# Patient Record
Sex: FEMALE | Race: BLACK OR AFRICAN AMERICAN | NOT HISPANIC OR LATINO
[De-identification: names, ages, dates, MRNs, and addresses within clinical notes are randomized per-mention and may not be internally consistent; named-entity substitution may affect disease eponyms.]

---

## 2018-12-27 ENCOUNTER — RX RENEWAL (OUTPATIENT)
Age: 63
End: 2018-12-27

## 2019-03-25 ENCOUNTER — RX RENEWAL (OUTPATIENT)
Age: 64
End: 2019-03-25

## 2019-03-27 ENCOUNTER — RECORD ABSTRACTING (OUTPATIENT)
Age: 64
End: 2019-03-27

## 2019-03-27 DIAGNOSIS — Z80.0 FAMILY HISTORY OF MALIGNANT NEOPLASM OF DIGESTIVE ORGANS: ICD-10-CM

## 2019-03-27 DIAGNOSIS — Z82.49 FAMILY HISTORY OF ISCHEMIC HEART DISEASE AND OTHER DISEASES OF THE CIRCULATORY SYSTEM: ICD-10-CM

## 2019-03-27 DIAGNOSIS — Z83.438 FAMILY HISTORY OF OTHER DISORDER OF LIPOPROTEIN METABOLISM AND OTHER LIPIDEMIA: ICD-10-CM

## 2019-03-27 DIAGNOSIS — Z80.3 FAMILY HISTORY OF MALIGNANT NEOPLASM OF BREAST: ICD-10-CM

## 2019-03-27 DIAGNOSIS — Z78.9 OTHER SPECIFIED HEALTH STATUS: ICD-10-CM

## 2019-03-29 ENCOUNTER — RX RENEWAL (OUTPATIENT)
Age: 64
End: 2019-03-29

## 2019-04-01 ENCOUNTER — APPOINTMENT (OUTPATIENT)
Dept: FAMILY MEDICINE | Facility: CLINIC | Age: 64
End: 2019-04-01
Payer: COMMERCIAL

## 2019-04-01 VITALS — SYSTOLIC BLOOD PRESSURE: 148 MMHG | DIASTOLIC BLOOD PRESSURE: 88 MMHG

## 2019-04-01 VITALS
WEIGHT: 176 LBS | OXYGEN SATURATION: 97 % | HEIGHT: 60 IN | BODY MASS INDEX: 34.55 KG/M2 | SYSTOLIC BLOOD PRESSURE: 160 MMHG | DIASTOLIC BLOOD PRESSURE: 100 MMHG | HEART RATE: 77 BPM

## 2019-04-01 DIAGNOSIS — F17.200 NICOTINE DEPENDENCE, UNSPECIFIED, UNCOMPLICATED: ICD-10-CM

## 2019-04-01 PROCEDURE — 99406 BEHAV CHNG SMOKING 3-10 MIN: CPT | Mod: 25

## 2019-04-01 PROCEDURE — G0444 DEPRESSION SCREEN ANNUAL: CPT

## 2019-04-01 PROCEDURE — 99214 OFFICE O/P EST MOD 30 MIN: CPT | Mod: 25

## 2019-04-01 NOTE — PHYSICAL EXAM
[Well Nourished] : well nourished [Well Developed] : well developed [No Lymphadenopathy] : no lymphadenopathy [Clear to Auscultation] : lungs were clear to auscultation bilaterally [Normal Rate] : normal rate  [Regular Rhythm] : with a regular rhythm [Normal S1, S2] : normal S1 and S2 [No Murmur] : no murmur heard [No Edema] : there was no peripheral edema [Speech Grossly Normal] : speech grossly normal [Normal Affect] : the affect was normal [Normal Mood] : the mood was normal

## 2019-04-01 NOTE — COUNSELING
[Tobacco Use Cessation Intermediate Greater Than 3 Minutes Up to 10 Minutes] : Tobacco Use Cessation Intermediate Greater Than 3 Minutes Up to 10 Minutes [ - Annual Depression Screening] : Annual Depression Screening

## 2019-04-02 LAB — CYTOLOGY CVX/VAG DOC THIN PREP: NEGATIVE

## 2019-04-02 NOTE — HEALTH RISK ASSESSMENT
[Patient reported mammogram was normal] : Patient reported mammogram was normal [Patient reported colonoscopy was normal] : Patient reported colonoscopy was normal [HIV test declined] : HIV test declined [Hepatitis C test declined] : Hepatitis C test declined [No falls in past year] : Patient reported no falls in the past year [0] : 2) Feeling down, depressed, or hopeless: Not at all (0) [Patient reported PAP Smear was normal] : Patient reported PAP Smear was normal [Change in mental status noted] : No change in mental status noted [Reports changes in hearing] : Reports no changes in hearing [Reports changes in vision] : Reports no changes in vision [MammogramDate] : 09/18 [PapSmearDate] : 07/11 [ColonoscopyDate] : 01/11 [ColonoscopyComments] : f/u 10 years [HepatitisCComments] : Will order with next bloodwork.  [de-identified] : little exercise but active job [de-identified] : good [[id number]] : 0

## 2019-04-02 NOTE — HISTORY OF PRESENT ILLNESS
[FreeTextEntry1] : Follow-up [de-identified] : Ms. JALEESA SILVEIRA is a 63 year old female here today for follow-up of BP and lipids. Last visit was 12/2017. Feels good.  Taking her medications as directed.  Diet is good. Little exercise. Wants to try patch to help her quit smoking. Motivated but not totally. \par

## 2019-04-02 NOTE — PLAN
[FreeTextEntry1] : Increase amlodipine from 5 mg to 10 mg daily.\par Continue other medications.\par Encouraged patient to continue to focus on weight through sensible and healthy eating and as much aerobic exercise as is tolerable.\par

## 2019-04-17 ENCOUNTER — APPOINTMENT (OUTPATIENT)
Dept: FAMILY MEDICINE | Facility: CLINIC | Age: 64
End: 2019-04-17
Payer: COMMERCIAL

## 2019-04-17 VITALS
WEIGHT: 176 LBS | HEART RATE: 62 BPM | OXYGEN SATURATION: 93 % | SYSTOLIC BLOOD PRESSURE: 120 MMHG | DIASTOLIC BLOOD PRESSURE: 80 MMHG | BODY MASS INDEX: 34.55 KG/M2 | HEIGHT: 60 IN

## 2019-04-17 PROCEDURE — 99214 OFFICE O/P EST MOD 30 MIN: CPT | Mod: 25

## 2019-04-17 PROCEDURE — 36415 COLL VENOUS BLD VENIPUNCTURE: CPT

## 2019-04-17 NOTE — HEALTH RISK ASSESSMENT
[] : Yes [No falls in past year] : Patient reported no falls in the past year [0] : 2) Feeling down, depressed, or hopeless: Not at all (0) [21-25] : 21-25 [de-identified] : 1 pack every 3 days [de-identified] : walking [de-identified] : good [[id number]] : 0

## 2019-04-17 NOTE — HISTORY OF PRESENT ILLNESS
[FreeTextEntry1] : Follow-up [de-identified] : Ms. JALEESA SILVEIRA is a 63 year old female here today for follow-up of her BP. Increased dose of amlodipine to 10 mg/day. c/o fatigue for last two weeks. Hx of iron deficiency anemia. \par

## 2019-04-17 NOTE — PLAN
[FreeTextEntry1] : Continue current medications\par  patches and start when patient is committed to stopping cigarette smoking.\par Encouraged patient to continue to focus on weight through sensible and healthy eating and as much aerobic exercise as is tolerable.\par

## 2019-04-17 NOTE — REVIEW OF SYSTEMS
[Fatigue] : fatigue [Negative] : Cardiovascular [Recent Change In Weight] : ~T no recent weight change [Shortness Of Breath] : no shortness of breath [Cough] : no cough

## 2019-04-17 NOTE — PHYSICAL EXAM
[No Edema] : there was no peripheral edema [Normal Mood] : the mood was normal [de-identified] : 1/6 WILBUR heard best 2nd ICS on the R

## 2019-04-17 NOTE — COUNSELING
[Weight management counseling provided] : Weight management [Healthy eating counseling provided] : healthy eating [Activity counseling provided] : activity [Good understanding] : Patient has a good understanding of disease, goals and obesity follow-up plan [Discussed Cessation Medication] : cessation medication was discussed [de-identified] : Patient to  nicotine patches.

## 2019-04-18 LAB
25(OH)D3 SERPL-MCNC: 44.6 NG/ML
ALBUMIN SERPL ELPH-MCNC: 4.8 G/DL
ALP BLD-CCNC: 74 U/L
ALT SERPL-CCNC: 26 U/L
ANION GAP SERPL CALC-SCNC: 13 MMOL/L
AST SERPL-CCNC: 32 U/L
BASOPHILS # BLD AUTO: 0.03 K/UL
BASOPHILS NFR BLD AUTO: 0.3 %
BILIRUB SERPL-MCNC: 0.4 MG/DL
BUN SERPL-MCNC: 18 MG/DL
CALCIUM SERPL-MCNC: 10.4 MG/DL
CHLORIDE SERPL-SCNC: 102 MMOL/L
CHOLEST SERPL-MCNC: 145 MG/DL
CHOLEST/HDLC SERPL: 3.4 RATIO
CO2 SERPL-SCNC: 27 MMOL/L
CREAT SERPL-MCNC: 0.85 MG/DL
EOSINOPHIL # BLD AUTO: 0.13 K/UL
EOSINOPHIL NFR BLD AUTO: 1.3 %
ESTIMATED AVERAGE GLUCOSE: 120 MG/DL
FERRITIN SERPL-MCNC: 175 NG/ML
GLUCOSE SERPL-MCNC: 89 MG/DL
HBA1C MFR BLD HPLC: 5.8 %
HCT VFR BLD CALC: 46.2 %
HDLC SERPL-MCNC: 43 MG/DL
HGB BLD-MCNC: 14.8 G/DL
IMM GRANULOCYTES NFR BLD AUTO: 0.3 %
IRON SATN MFR SERPL: 34 %
IRON SERPL-MCNC: 94 UG/DL
LDLC SERPL CALC-MCNC: 83 MG/DL
LYMPHOCYTES # BLD AUTO: 2.91 K/UL
LYMPHOCYTES NFR BLD AUTO: 28.8 %
MAN DIFF?: NORMAL
MCHC RBC-ENTMCNC: 31 PG
MCHC RBC-ENTMCNC: 32 GM/DL
MCV RBC AUTO: 96.9 FL
MONOCYTES # BLD AUTO: 0.48 K/UL
MONOCYTES NFR BLD AUTO: 4.7 %
NEUTROPHILS # BLD AUTO: 6.53 K/UL
NEUTROPHILS NFR BLD AUTO: 64.6 %
PLATELET # BLD AUTO: 271 K/UL
POTASSIUM SERPL-SCNC: 3.8 MMOL/L
PROT SERPL-MCNC: 7.6 G/DL
RBC # BLD: 4.77 M/UL
RBC # FLD: 15.6 %
SODIUM SERPL-SCNC: 142 MMOL/L
TIBC SERPL-MCNC: 276 UG/DL
TRIGL SERPL-MCNC: 97 MG/DL
TSH SERPL-ACNC: 1.4 UIU/ML
UIBC SERPL-MCNC: 182 UG/DL
VIT B12 SERPL-MCNC: 992 PG/ML
WBC # FLD AUTO: 10.11 K/UL

## 2019-04-22 ENCOUNTER — RX RENEWAL (OUTPATIENT)
Age: 64
End: 2019-04-22

## 2019-06-23 ENCOUNTER — RX RENEWAL (OUTPATIENT)
Age: 64
End: 2019-06-23

## 2019-09-16 ENCOUNTER — RX RENEWAL (OUTPATIENT)
Age: 64
End: 2019-09-16

## 2019-09-26 ENCOUNTER — RESULT REVIEW (OUTPATIENT)
Age: 64
End: 2019-09-26

## 2019-10-21 ENCOUNTER — RX RENEWAL (OUTPATIENT)
Age: 64
End: 2019-10-21

## 2019-12-24 ENCOUNTER — RX RENEWAL (OUTPATIENT)
Age: 64
End: 2019-12-24

## 2020-03-17 ENCOUNTER — RX RENEWAL (OUTPATIENT)
Age: 65
End: 2020-03-17

## 2020-04-19 ENCOUNTER — RX RENEWAL (OUTPATIENT)
Age: 65
End: 2020-04-19

## 2020-09-21 ENCOUNTER — RX RENEWAL (OUTPATIENT)
Age: 65
End: 2020-09-21

## 2020-09-22 RX ORDER — AMLODIPINE BESYLATE 5 MG/1
5 TABLET ORAL
Qty: 90 | Refills: 1 | Status: DISCONTINUED | COMMUNITY
Start: 2019-06-23 | End: 2020-09-22

## 2020-09-24 ENCOUNTER — APPOINTMENT (OUTPATIENT)
Dept: FAMILY MEDICINE | Facility: CLINIC | Age: 65
End: 2020-09-24
Payer: COMMERCIAL

## 2020-09-24 VITALS
BODY MASS INDEX: 34.55 KG/M2 | DIASTOLIC BLOOD PRESSURE: 90 MMHG | SYSTOLIC BLOOD PRESSURE: 160 MMHG | WEIGHT: 176 LBS | HEIGHT: 60 IN

## 2020-09-24 VITALS — DIASTOLIC BLOOD PRESSURE: 80 MMHG | SYSTOLIC BLOOD PRESSURE: 142 MMHG

## 2020-09-24 DIAGNOSIS — R73.03 PREDIABETES.: ICD-10-CM

## 2020-09-24 DIAGNOSIS — Z86.39 PERSONAL HISTORY OF OTHER ENDOCRINE, NUTRITIONAL AND METABOLIC DISEASE: ICD-10-CM

## 2020-09-24 DIAGNOSIS — Z00.00 ENCOUNTER FOR GENERAL ADULT MEDICAL EXAMINATION W/OUT ABNORMAL FINDINGS: ICD-10-CM

## 2020-09-24 PROCEDURE — 99406 BEHAV CHNG SMOKING 3-10 MIN: CPT

## 2020-09-24 PROCEDURE — G0438: CPT | Mod: 25

## 2020-09-24 NOTE — HEALTH RISK ASSESSMENT
[] : Yes [Monthly or less (1 pt)] : Monthly or less (1 point) [1 or 2 (0 pts)] : 1 or 2 (0 points) [Never (0 pts)] : Never (0 points) [No] : In the past 12 months have you used drugs other than those required for medical reasons? No [No falls in past year] : Patient reported no falls in the past year [0] : 2) Feeling down, depressed, or hopeless: Not at all (0) [Yes] : Yes [30 or more] : 30 or more [Audit-CScore] : 1 [de-identified] : walking [de-identified] : normal [[id number]] : 0

## 2020-09-24 NOTE — PLAN
[FreeTextEntry1] : Encouraged patient to continue to focus on weight through sensible and healthy eating and as much aerobic exercise as is tolerable.\par F/U in a few days for fasting labs.

## 2020-09-24 NOTE — COUNSELING
[Cessation strategies including cessation program discussed] : Cessation strategies including cessation program discussed [Use of nicotine replacement therapies and other medications discussed] : Use of nicotine replacement therapies and other medications discussed [Willing to Quit Smoking] : Willing to quit smoking [Tobacco Use Cessation Intermediate Greater Than 3 Minutes Up to 10 Minutes] : Tobacco Use Cessation Intermediate Greater Than 3 Minutes Up to 10 Minutes [FreeTextEntry1] : 5

## 2020-09-24 NOTE — HISTORY OF PRESENT ILLNESS
[FreeTextEntry1] : Medication refill\par F/U\par Due for her first Medicare Wellness [de-identified] : Ms. JALEESA SILVEIRA is a 64 year old female here today for follow-up\par Feeling good\par Smoking 1/2 PPD- no cough or SOB\par Having mammogram on Monday.\par Last colonoscopy was 2011. Nml\par Refuses vaccinations. \par

## 2020-09-24 NOTE — REVIEW OF SYSTEMS
[Negative] : Psychiatric [Recent Change In Weight] : ~T no recent weight change [Vision Problems] : no vision problems [FreeTextEntry9] : occasional right hip pain

## 2020-09-28 ENCOUNTER — RESULT REVIEW (OUTPATIENT)
Age: 65
End: 2020-09-28

## 2020-09-28 ENCOUNTER — RX RENEWAL (OUTPATIENT)
Age: 65
End: 2020-09-28

## 2020-09-28 LAB
25(OH)D3 SERPL-MCNC: 24.5 NG/ML
ALBUMIN SERPL ELPH-MCNC: 4.6 G/DL
ALP BLD-CCNC: 85 U/L
ALT SERPL-CCNC: 27 U/L
ANION GAP SERPL CALC-SCNC: 17 MMOL/L
AST SERPL-CCNC: 30 U/L
BASOPHILS # BLD AUTO: 0.03 K/UL
BASOPHILS NFR BLD AUTO: 0.3 %
BILIRUB SERPL-MCNC: 0.4 MG/DL
BUN SERPL-MCNC: 18 MG/DL
CALCIUM SERPL-MCNC: 9.7 MG/DL
CHLORIDE SERPL-SCNC: 102 MMOL/L
CHOLEST SERPL-MCNC: 149 MG/DL
CHOLEST/HDLC SERPL: 3.5 RATIO
CO2 SERPL-SCNC: 23 MMOL/L
CREAT SERPL-MCNC: 0.83 MG/DL
EOSINOPHIL # BLD AUTO: 0.15 K/UL
EOSINOPHIL NFR BLD AUTO: 1.5 %
GLUCOSE SERPL-MCNC: 90 MG/DL
HCT VFR BLD CALC: 48.5 %
HDLC SERPL-MCNC: 42 MG/DL
HGB BLD-MCNC: 14.8 G/DL
IMM GRANULOCYTES NFR BLD AUTO: 0.2 %
LDLC SERPL CALC-MCNC: 87 MG/DL
LYMPHOCYTES # BLD AUTO: 3.08 K/UL
LYMPHOCYTES NFR BLD AUTO: 29.8 %
MAN DIFF?: NORMAL
MCHC RBC-ENTMCNC: 30.5 GM/DL
MCHC RBC-ENTMCNC: 31.1 PG
MCV RBC AUTO: 101.9 FL
MONOCYTES # BLD AUTO: 0.49 K/UL
MONOCYTES NFR BLD AUTO: 4.7 %
NEUTROPHILS # BLD AUTO: 6.57 K/UL
NEUTROPHILS NFR BLD AUTO: 63.5 %
PLATELET # BLD AUTO: 252 K/UL
POTASSIUM SERPL-SCNC: 3.8 MMOL/L
PROT SERPL-MCNC: 7.4 G/DL
RBC # BLD: 4.76 M/UL
RBC # FLD: 17 %
SODIUM SERPL-SCNC: 142 MMOL/L
TRIGL SERPL-MCNC: 95 MG/DL
WBC # FLD AUTO: 10.34 K/UL

## 2020-10-22 PROBLEM — Z00.00 MEDICARE ANNUAL WELLNESS VISIT, SUBSEQUENT: Status: RESOLVED | Noted: 2020-09-24 | Resolved: 2020-10-22

## 2020-12-18 ENCOUNTER — RX RENEWAL (OUTPATIENT)
Age: 65
End: 2020-12-18

## 2021-03-12 ENCOUNTER — NON-APPOINTMENT (OUTPATIENT)
Age: 66
End: 2021-03-12

## 2021-03-12 ENCOUNTER — APPOINTMENT (OUTPATIENT)
Dept: FAMILY MEDICINE | Facility: CLINIC | Age: 66
End: 2021-03-12
Payer: COMMERCIAL

## 2021-03-12 VITALS
TEMPERATURE: 97.3 F | HEIGHT: 60 IN | BODY MASS INDEX: 34.36 KG/M2 | SYSTOLIC BLOOD PRESSURE: 140 MMHG | DIASTOLIC BLOOD PRESSURE: 80 MMHG | OXYGEN SATURATION: 97 % | WEIGHT: 175 LBS | HEART RATE: 60 BPM

## 2021-03-12 DIAGNOSIS — Z01.818 ENCOUNTER FOR OTHER PREPROCEDURAL EXAMINATION: ICD-10-CM

## 2021-03-12 PROCEDURE — 99072 ADDL SUPL MATRL&STAF TM PHE: CPT

## 2021-03-12 PROCEDURE — 99213 OFFICE O/P EST LOW 20 MIN: CPT | Mod: 25

## 2021-03-12 PROCEDURE — 36415 COLL VENOUS BLD VENIPUNCTURE: CPT

## 2021-03-12 PROCEDURE — 93000 ELECTROCARDIOGRAM COMPLETE: CPT

## 2021-03-15 LAB
25(OH)D3 SERPL-MCNC: 54.3 NG/ML
ALBUMIN SERPL ELPH-MCNC: 4.7 G/DL
ALP BLD-CCNC: 86 U/L
ALT SERPL-CCNC: 22 U/L
ANION GAP SERPL CALC-SCNC: 16 MMOL/L
AST SERPL-CCNC: 30 U/L
BASOPHILS # BLD AUTO: 0.03 K/UL
BASOPHILS NFR BLD AUTO: 0.3 %
BILIRUB SERPL-MCNC: 0.5 MG/DL
BUN SERPL-MCNC: 19 MG/DL
CALCIUM SERPL-MCNC: 9.7 MG/DL
CHLORIDE SERPL-SCNC: 100 MMOL/L
CHOLEST SERPL-MCNC: 148 MG/DL
CO2 SERPL-SCNC: 26 MMOL/L
CREAT SERPL-MCNC: 0.87 MG/DL
EOSINOPHIL # BLD AUTO: 0.11 K/UL
EOSINOPHIL NFR BLD AUTO: 1.2 %
GLUCOSE SERPL-MCNC: 78 MG/DL
HCT VFR BLD CALC: 48 %
HDLC SERPL-MCNC: 48 MG/DL
HGB BLD-MCNC: 15.4 G/DL
IMM GRANULOCYTES NFR BLD AUTO: 0.3 %
LDLC SERPL CALC-MCNC: 81 MG/DL
LYMPHOCYTES # BLD AUTO: 3.13 K/UL
LYMPHOCYTES NFR BLD AUTO: 33.2 %
MAN DIFF?: NORMAL
MCHC RBC-ENTMCNC: 31.5 PG
MCHC RBC-ENTMCNC: 32.1 GM/DL
MCV RBC AUTO: 98.2 FL
MONOCYTES # BLD AUTO: 0.64 K/UL
MONOCYTES NFR BLD AUTO: 6.8 %
NEUTROPHILS # BLD AUTO: 5.5 K/UL
NEUTROPHILS NFR BLD AUTO: 58.2 %
NONHDLC SERPL-MCNC: 100 MG/DL
PLATELET # BLD AUTO: 277 K/UL
POTASSIUM SERPL-SCNC: 3.7 MMOL/L
PROT SERPL-MCNC: 7.9 G/DL
RBC # BLD: 4.89 M/UL
RBC # FLD: 15.7 %
SODIUM SERPL-SCNC: 142 MMOL/L
TRIGL SERPL-MCNC: 97 MG/DL
WBC # FLD AUTO: 9.44 K/UL

## 2021-03-15 NOTE — HISTORY OF PRESENT ILLNESS
[No Pertinent Cardiac History] : no history of aortic stenosis, atrial fibrillation, coronary artery disease, recent myocardial infarction, or implantable device/pacemaker [Smoker] : smoker [No Adverse Anesthesia Reaction] : no adverse anesthesia reaction in self or family member [Family Member] : family member [(Patient denies any chest pain, claudication, dyspnea on exertion, orthopnea, palpitations or syncope)] : Patient denies any chest pain, claudication, dyspnea on exertion, orthopnea, palpitations or syncope [Asthma] : no asthma [COPD] : no COPD [Sleep Apnea] : no sleep apnea [Chronic Anticoagulation] : no chronic anticoagulation [Chronic Kidney Disease] : no chronic kidney disease [Diabetes] : no diabetes [FreeTextEntry1] : Cataract surgery [FreeTextEntry2] : 04/06/21 [FreeTextEntry3] :  [FreeTextEntry4] : Ms. JALEESA SILVEIRA is a 65 year old female here today for preoperative clearance.\par

## 2021-04-06 ENCOUNTER — HOSPITAL ENCOUNTER (OUTPATIENT)
Dept: HOSPITAL 74 - FASU | Age: 66
Discharge: HOME | End: 2021-04-06
Attending: OPHTHALMOLOGY
Payer: COMMERCIAL

## 2021-04-06 VITALS — DIASTOLIC BLOOD PRESSURE: 68 MMHG | HEART RATE: 78 BPM | SYSTOLIC BLOOD PRESSURE: 127 MMHG

## 2021-04-06 VITALS — TEMPERATURE: 98.1 F

## 2021-04-06 VITALS — BODY MASS INDEX: 35.5 KG/M2

## 2021-04-06 DIAGNOSIS — H26.9: Primary | ICD-10-CM

## 2021-04-06 PROCEDURE — 08RK3JZ REPLACEMENT OF LEFT LENS WITH SYNTHETIC SUBSTITUTE, PERCUTANEOUS APPROACH: ICD-10-PCS | Performed by: OPHTHALMOLOGY

## 2021-04-06 RX ADMIN — CYCLOPENTOLATE HYDROCHLORIDE ONE DROP: 10 SOLUTION/ DROPS OPHTHALMIC at 07:45

## 2021-04-06 RX ADMIN — TROPICAMIDE ONE DROP: 10 SOLUTION/ DROPS OPHTHALMIC at 07:45

## 2021-04-06 RX ADMIN — OFLOXACIN ONE DROP: 3 SOLUTION/ DROPS OPHTHALMIC at 08:05

## 2021-04-06 RX ADMIN — CYCLOPENTOLATE HYDROCHLORIDE ONE DROP: 10 SOLUTION/ DROPS OPHTHALMIC at 08:05

## 2021-04-06 RX ADMIN — PHENYLEPHRINE HYDROCHLORIDE ONE DROP: 0.25 SPRAY NASAL at 07:50

## 2021-04-06 RX ADMIN — PHENYLEPHRINE HYDROCHLORIDE ONE DROP: 0.25 SPRAY NASAL at 07:45

## 2021-04-06 RX ADMIN — OFLOXACIN ONE DROP: 3 SOLUTION/ DROPS OPHTHALMIC at 07:45

## 2021-04-06 RX ADMIN — TROPICAMIDE ONE DROP: 10 SOLUTION/ DROPS OPHTHALMIC at 08:05

## 2021-04-06 RX ADMIN — CYCLOPENTOLATE HYDROCHLORIDE ONE DROP: 10 SOLUTION/ DROPS OPHTHALMIC at 07:55

## 2021-04-06 RX ADMIN — KETOROLAC TROMETHAMINE ONE DROP: 5 SOLUTION OPHTHALMIC at 07:55

## 2021-04-06 RX ADMIN — OFLOXACIN ONE DROP: 3 SOLUTION/ DROPS OPHTHALMIC at 08:00

## 2021-04-06 RX ADMIN — KETOROLAC TROMETHAMINE ONE DROP: 5 SOLUTION OPHTHALMIC at 08:00

## 2021-04-06 RX ADMIN — PHENYLEPHRINE HYDROCHLORIDE ONE DROP: 0.25 SPRAY NASAL at 07:55

## 2021-04-06 RX ADMIN — CYCLOPENTOLATE HYDROCHLORIDE ONE DROP: 10 SOLUTION/ DROPS OPHTHALMIC at 08:00

## 2021-04-06 RX ADMIN — OFLOXACIN ONE DROP: 3 SOLUTION/ DROPS OPHTHALMIC at 07:50

## 2021-04-06 RX ADMIN — OFLOXACIN ONE DROP: 3 SOLUTION/ DROPS OPHTHALMIC at 07:55

## 2021-04-06 RX ADMIN — TROPICAMIDE ONE DROP: 10 SOLUTION/ DROPS OPHTHALMIC at 07:50

## 2021-04-06 RX ADMIN — KETOROLAC TROMETHAMINE ONE DROP: 5 SOLUTION OPHTHALMIC at 08:05

## 2021-04-06 RX ADMIN — TROPICAMIDE ONE DROP: 10 SOLUTION/ DROPS OPHTHALMIC at 08:00

## 2021-04-06 RX ADMIN — PHENYLEPHRINE HYDROCHLORIDE ONE DROP: 0.25 SPRAY NASAL at 08:00

## 2021-04-06 RX ADMIN — PHENYLEPHRINE HYDROCHLORIDE ONE DROP: 0.25 SPRAY NASAL at 08:05

## 2021-04-06 RX ADMIN — KETOROLAC TROMETHAMINE ONE DROP: 5 SOLUTION OPHTHALMIC at 07:50

## 2021-04-06 RX ADMIN — KETOROLAC TROMETHAMINE ONE DROP: 5 SOLUTION OPHTHALMIC at 07:45

## 2021-04-06 RX ADMIN — CYCLOPENTOLATE HYDROCHLORIDE ONE DROP: 10 SOLUTION/ DROPS OPHTHALMIC at 07:50

## 2021-04-06 RX ADMIN — TROPICAMIDE ONE DROP: 10 SOLUTION/ DROPS OPHTHALMIC at 07:55

## 2021-05-28 ENCOUNTER — APPOINTMENT (OUTPATIENT)
Dept: FAMILY MEDICINE | Facility: CLINIC | Age: 66
End: 2021-05-28
Payer: COMMERCIAL

## 2021-05-28 PROCEDURE — 99211 OFF/OP EST MAY X REQ PHY/QHP: CPT

## 2021-06-01 ENCOUNTER — HOSPITAL ENCOUNTER (OUTPATIENT)
Dept: HOSPITAL 74 - FASU | Age: 66
Discharge: HOME | End: 2021-06-01
Attending: OPHTHALMOLOGY
Payer: COMMERCIAL

## 2021-06-01 VITALS — SYSTOLIC BLOOD PRESSURE: 139 MMHG | DIASTOLIC BLOOD PRESSURE: 81 MMHG | HEART RATE: 76 BPM

## 2021-06-01 VITALS — BODY MASS INDEX: 35.5 KG/M2

## 2021-06-01 VITALS — TEMPERATURE: 97.9 F

## 2021-06-01 DIAGNOSIS — H26.9: Primary | ICD-10-CM

## 2021-06-01 PROCEDURE — 08RJ3JZ REPLACEMENT OF RIGHT LENS WITH SYNTHETIC SUBSTITUTE, PERCUTANEOUS APPROACH: ICD-10-PCS | Performed by: OPHTHALMOLOGY

## 2021-06-01 RX ADMIN — PHENYLEPHRINE HYDROCHLORIDE SCH DROP: 0.25 SPRAY NASAL at 09:30

## 2021-06-01 RX ADMIN — PHENYLEPHRINE HYDROCHLORIDE SCH DROP: 0.25 SPRAY NASAL at 09:40

## 2021-06-01 RX ADMIN — PHENYLEPHRINE HYDROCHLORIDE SCH DROP: 0.25 SPRAY NASAL at 09:35

## 2021-06-01 RX ADMIN — KETOROLAC TROMETHAMINE SCH DROP: 5 SOLUTION OPHTHALMIC at 09:40

## 2021-06-01 RX ADMIN — OFLOXACIN SCH DROP: 3 SOLUTION/ DROPS OPHTHALMIC at 09:45

## 2021-06-01 RX ADMIN — PHENYLEPHRINE HYDROCHLORIDE SCH DROP: 0.25 SPRAY NASAL at 09:25

## 2021-06-01 RX ADMIN — CYCLOPENTOLATE HYDROCHLORIDE SCH DROP: 10 SOLUTION/ DROPS OPHTHALMIC at 09:25

## 2021-06-01 RX ADMIN — OFLOXACIN SCH DROP: 3 SOLUTION/ DROPS OPHTHALMIC at 09:40

## 2021-06-01 RX ADMIN — KETOROLAC TROMETHAMINE SCH DROP: 5 SOLUTION OPHTHALMIC at 09:45

## 2021-06-01 RX ADMIN — TROPICAMIDE SCH DROP: 10 SOLUTION/ DROPS OPHTHALMIC at 09:30

## 2021-06-01 RX ADMIN — CYCLOPENTOLATE HYDROCHLORIDE SCH DROP: 10 SOLUTION/ DROPS OPHTHALMIC at 09:30

## 2021-06-01 RX ADMIN — KETOROLAC TROMETHAMINE SCH DROP: 5 SOLUTION OPHTHALMIC at 09:25

## 2021-06-01 RX ADMIN — OFLOXACIN SCH DROP: 3 SOLUTION/ DROPS OPHTHALMIC at 09:35

## 2021-06-01 RX ADMIN — OFLOXACIN SCH DROP: 3 SOLUTION/ DROPS OPHTHALMIC at 09:30

## 2021-06-01 RX ADMIN — TROPICAMIDE SCH DROP: 10 SOLUTION/ DROPS OPHTHALMIC at 09:40

## 2021-06-01 RX ADMIN — KETOROLAC TROMETHAMINE SCH DROP: 5 SOLUTION OPHTHALMIC at 09:30

## 2021-06-01 RX ADMIN — CYCLOPENTOLATE HYDROCHLORIDE SCH DROP: 10 SOLUTION/ DROPS OPHTHALMIC at 09:45

## 2021-06-01 RX ADMIN — TROPICAMIDE SCH DROP: 10 SOLUTION/ DROPS OPHTHALMIC at 09:25

## 2021-06-01 RX ADMIN — PHENYLEPHRINE HYDROCHLORIDE SCH DROP: 0.25 SPRAY NASAL at 09:45

## 2021-06-01 RX ADMIN — TROPICAMIDE SCH DROP: 10 SOLUTION/ DROPS OPHTHALMIC at 09:35

## 2021-06-01 RX ADMIN — OFLOXACIN SCH DROP: 3 SOLUTION/ DROPS OPHTHALMIC at 09:25

## 2021-06-01 RX ADMIN — CYCLOPENTOLATE HYDROCHLORIDE SCH DROP: 10 SOLUTION/ DROPS OPHTHALMIC at 09:40

## 2021-06-01 RX ADMIN — CYCLOPENTOLATE HYDROCHLORIDE SCH DROP: 10 SOLUTION/ DROPS OPHTHALMIC at 09:35

## 2021-06-01 RX ADMIN — KETOROLAC TROMETHAMINE SCH DROP: 5 SOLUTION OPHTHALMIC at 09:35

## 2021-06-01 RX ADMIN — TROPICAMIDE SCH DROP: 10 SOLUTION/ DROPS OPHTHALMIC at 09:45

## 2021-06-03 ENCOUNTER — RX RENEWAL (OUTPATIENT)
Age: 66
End: 2021-06-03

## 2021-06-03 LAB — SARS-COV-2 N GENE NPH QL NAA+PROBE: NOT DETECTED

## 2021-06-05 ENCOUNTER — APPOINTMENT (OUTPATIENT)
Dept: FAMILY MEDICINE | Facility: CLINIC | Age: 66
End: 2021-06-05
Payer: COMMERCIAL

## 2021-06-05 VITALS
SYSTOLIC BLOOD PRESSURE: 118 MMHG | BODY MASS INDEX: 34.16 KG/M2 | HEIGHT: 60 IN | TEMPERATURE: 98.6 F | WEIGHT: 174 LBS | HEART RATE: 78 BPM | DIASTOLIC BLOOD PRESSURE: 70 MMHG

## 2021-06-05 PROCEDURE — 99397 PER PM REEVAL EST PAT 65+ YR: CPT | Mod: 25

## 2021-06-05 PROCEDURE — 99407 BEHAV CHNG SMOKING > 10 MIN: CPT

## 2021-06-05 PROCEDURE — G0444 DEPRESSION SCREEN ANNUAL: CPT | Mod: 59

## 2021-06-05 NOTE — HISTORY OF PRESENT ILLNESS
[FreeTextEntry1] : Annual\par Hip pain [de-identified] : Ms. JALEESA SILVEIRA is a 65 year old female here today for her annual.\par c/o hip pain x 2 days. Woke up with patient. \par

## 2021-06-05 NOTE — COUNSELING
[Risk of tobacco use and health benefits of smoking cessation discussed] : Risk of tobacco use and health benefits of smoking cessation discussed [Use of nicotine replacement therapies and other medications discussed] : Use of nicotine replacement therapies and other medications discussed [Cessation strategies including cessation program discussed] : Cessation strategies including cessation program discussed [Willing to Quit Smoking] : Willing to quit smoking [Tobacco Use Cessation Intensive Greater Than 10 Minutes] : Tobacco Use Cessation Intensive Greater Than 10 Minutes [Benefits of weight loss discussed] : Benefits of weight loss discussed [Encouraged to increase physical activity] : Encouraged to increase physical activity [Target Wt Loss Goal ___] : Weight Loss Goals: Target weight loss goal [unfilled] lbs [Weigh Self Weekly] : weigh self weekly [____ min/wk Activity] : [unfilled] min/wk activity [Decrease Portions] : decrease portions [Keep Food Diary] : keep food diary [Needs reinforcement, provided] : Patient needs reinforcement on understanding of disease, goals and obesity follow-up plan; reinforcement was provided [FreeTextEntry2] : Discussed slow weaning and then if needed, try  oral agent or patch.  [FreeTextEntry3] : 12 [FreeTextEntry4] : 15

## 2021-06-05 NOTE — PHYSICAL EXAM
[No Lymphadenopathy] : no lymphadenopathy [Thyroid Normal, No Nodules] : the thyroid was normal and there were no nodules present [Clear to Auscultation] : lungs were clear to auscultation bilaterally [Normal Rate] : normal rate  [Regular Rhythm] : with a regular rhythm [Normal S1, S2] : normal S1 and S2 [No Edema] : there was no peripheral edema [Normal] : affect was normal and insight and judgment were intact [de-identified] : Overweight female [de-identified] : 1/6 WILBUR heard in aortic area [de-identified] : Increased cerumen in the right ear. [de-identified] : Tenderness with palpation of the right inguinal area- no reproduction of pain with movements of the leg. No hip pain. [de-identified] : Patient is walking with a limp.

## 2021-06-05 NOTE — HEALTH RISK ASSESSMENT
[Good] : ~his/her~  mood as  good [] : Yes [Yes] : Yes [Monthly or less (1 pt)] : Monthly or less (1 point) [1 or 2 (0 pts)] : 1 or 2 (0 points) [Never (0 pts)] : Never (0 points) [No] : In the past 12 months have you used drugs other than those required for medical reasons? No [No falls in past year] : Patient reported no falls in the past year [0] : 2) Feeling down, depressed, or hopeless: Not at all (0) [Patient reported mammogram was normal] : Patient reported mammogram was normal [Patient reported bone density results were normal] : Patient reported bone density results were normal [Patient reported colonoscopy was normal] : Patient reported colonoscopy was normal [HIV test declined] : HIV test declined [Hepatitis C test declined] : Hepatitis C test declined [None] : None [With Family] : lives with family [# of Members in Household ___] :  household currently consist of [unfilled] member(s) [Employed] : employed [High School] : high school [Single] : single [Feels Safe at Home] : Feels safe at home [Fully functional (bathing, dressing, toileting, transferring, walking, feeding)] : Fully functional (bathing, dressing, toileting, transferring, walking, feeding) [Fully functional (using the telephone, shopping, preparing meals, housekeeping, doing laundry, using] : Fully functional and needs no help or supervision to perform IADLs (using the telephone, shopping, preparing meals, housekeeping, doing laundry, using transportation, managing medications and managing finances) [Smoke Detector] : smoke detector [Carbon Monoxide Detector] : carbon monoxide detector [Seat Belt] :  uses seat belt [26-29] : 26-24 [No Retinopathy] : No retinopathy [Patient declined PAP Smear] : Patient declined PAP Smear [de-identified] : social drinker [Audit-CScore] : 1 [de-identified] : none [de-identified] : well balanced  [NHQ3Numsu] : 0 [EyeExamDate] : 01/21 [Change in mental status noted] : No change in mental status noted [Language] : denies difficulty with language [Learning/Retaining New Information] : denies difficulty learning/retaining new information [Behavior] : denies difficulty with behavior [Handling Complex Tasks] : denies difficulty handling complex tasks [Reasoning] : denies difficulty with reasoning [Spatial Ability and Orientation] : denies difficulty with spatial ability and orientation [Sexually Active] : not sexually active [High Risk Behavior] : no high risk behavior [Reports changes in hearing] : Reports no changes in hearing [Reports changes in dental health] : Reports no changes in dental health [Reports changes in vision] : Reports no changes in vision [Guns at Home] : no guns at home [Safety elements used in home] : no safety elements used in home [Sunscreen] : does not use sunscreen [Travel to Developing Areas] : does not  travel to developing areas [TB Exposure] : is not being exposed to tuberculosis [Caregiver Concerns] : does not have caregiver concerns [MammogramDate] : 09/20 [BoneDensityDate] : 01/19 [ColonoscopyDate] : 01/16 [FreeTextEntry2] : [facility] [de-identified] : Cataracts Removed in April left side and June her right [Patient/Caregiver not ready to engage] : Patient/Caregiver not ready to engage [AdvancecareDate] : 06/21

## 2021-06-07 ENCOUNTER — RESULT REVIEW (OUTPATIENT)
Age: 66
End: 2021-06-07

## 2021-06-08 ENCOUNTER — RX RENEWAL (OUTPATIENT)
Age: 66
End: 2021-06-08

## 2021-07-11 ENCOUNTER — RX RENEWAL (OUTPATIENT)
Age: 66
End: 2021-07-11

## 2021-09-13 ENCOUNTER — RX RENEWAL (OUTPATIENT)
Age: 66
End: 2021-09-13

## 2021-10-02 ENCOUNTER — RESULT REVIEW (OUTPATIENT)
Age: 66
End: 2021-10-02

## 2022-06-06 ENCOUNTER — APPOINTMENT (OUTPATIENT)
Dept: FAMILY MEDICINE | Facility: CLINIC | Age: 67
End: 2022-06-06
Payer: COMMERCIAL

## 2022-06-06 VITALS
HEIGHT: 60 IN | SYSTOLIC BLOOD PRESSURE: 120 MMHG | WEIGHT: 170 LBS | HEART RATE: 73 BPM | BODY MASS INDEX: 33.38 KG/M2 | DIASTOLIC BLOOD PRESSURE: 80 MMHG

## 2022-06-06 DIAGNOSIS — E55.9 VITAMIN D DEFICIENCY, UNSPECIFIED: ICD-10-CM

## 2022-06-06 DIAGNOSIS — E66.9 OBESITY, UNSPECIFIED: ICD-10-CM

## 2022-06-06 PROCEDURE — 36415 COLL VENOUS BLD VENIPUNCTURE: CPT

## 2022-06-06 PROCEDURE — G0444 DEPRESSION SCREEN ANNUAL: CPT | Mod: 59

## 2022-06-06 PROCEDURE — 99397 PER PM REEVAL EST PAT 65+ YR: CPT | Mod: 25

## 2022-06-06 PROCEDURE — 99406 BEHAV CHNG SMOKING 3-10 MIN: CPT

## 2022-06-06 RX ORDER — ERGOCALCIFEROL 1.25 MG/1
1.25 MG CAPSULE ORAL
Qty: 12 | Refills: 3 | Status: DISCONTINUED | COMMUNITY
Start: 2020-09-28 | End: 2022-06-06

## 2022-06-06 RX ORDER — ERGOCALCIFEROL 1.25 MG/1
1.25 MG CAPSULE, LIQUID FILLED ORAL
Qty: 12 | Refills: 0 | Status: DISCONTINUED | COMMUNITY
Start: 2020-09-28 | End: 2022-06-06

## 2022-06-06 RX ORDER — NICOTINE 21 MG/24HR
14 PATCH, TRANSDERMAL 24 HOURS TRANSDERMAL DAILY
Qty: 30 | Refills: 2 | Status: DISCONTINUED | COMMUNITY
Start: 2019-04-01 | End: 2022-06-06

## 2022-06-06 RX ORDER — NICOTINE 21 MG/24HR
21 PATCH, TRANSDERMAL 24 HOURS TRANSDERMAL DAILY
Qty: 30 | Refills: 2 | Status: DISCONTINUED | COMMUNITY
Start: 2019-04-01 | End: 2022-06-06

## 2022-06-06 NOTE — COUNSELING
[Yes] : Risk of tobacco use and health benefits of smoking cessation discussed: Yes [Cessation strategies including cessation program discussed] : Cessation strategies including cessation program discussed [Use of nicotine replacement therapies and other medications discussed] : Use of nicotine replacement therapies and other medications discussed [No] : Not willing to quit smoking [FreeTextEntry2] : Advised slow weaning.  [FreeTextEntry1] : 5

## 2022-06-06 NOTE — HEALTH RISK ASSESSMENT
[Good] : ~his/her~  mood as  good [Yes] : Yes [Monthly or less (1 pt)] : Monthly or less (1 point) [1 or 2 (0 pts)] : 1 or 2 (0 points) [Never (0 pts)] : Never (0 points) [No] : In the past 12 months have you used drugs other than those required for medical reasons? No [No falls in past year] : Patient reported no falls in the past year [0] : 2) Feeling down, depressed, or hopeless: Not at all (0) [No Retinopathy] : No retinopathy [Patient reported mammogram was normal] : Patient reported mammogram was normal [Patient declined PAP Smear] : Patient declined PAP Smear [Patient reported bone density results were normal] : Patient reported bone density results were normal [Patient reported colonoscopy was normal] : Patient reported colonoscopy was normal [HIV test declined] : HIV test declined [Hepatitis C test declined] : Hepatitis C test declined [None] : None [With Family] : lives with family [# of Members in Household ___] :  household currently consist of [unfilled] member(s) [Employed] : employed [High School] : high school [Single] : single [Feels Safe at Home] : Feels safe at home [Fully functional (bathing, dressing, toileting, transferring, walking, feeding)] : Fully functional (bathing, dressing, toileting, transferring, walking, feeding) [Fully functional (using the telephone, shopping, preparing meals, housekeeping, doing laundry, using] : Fully functional and needs no help or supervision to perform IADLs (using the telephone, shopping, preparing meals, housekeeping, doing laundry, using transportation, managing medications and managing finances) [Smoke Detector] : smoke detector [Carbon Monoxide Detector] : carbon monoxide detector [Seat Belt] :  uses seat belt [Current] : Current [15-19] : 15-19 [PHQ-2 Negative - No further assessment needed] : PHQ-2 Negative - No further assessment needed [de-identified] : 6 cigs a day x 50 yrs [de-identified] : social drinker [Audit-CScore] : 1 [de-identified] : none [de-identified] : well balanced  [[id number]] : 0 [EyeExamDate] : 05/21 [Change in mental status noted] : No change in mental status noted [Language] : denies difficulty with language [Behavior] : denies difficulty with behavior [Learning/Retaining New Information] : denies difficulty learning/retaining new information [Handling Complex Tasks] : denies difficulty handling complex tasks [Reasoning] : denies difficulty with reasoning [Spatial Ability and Orientation] : denies difficulty with spatial ability and orientation [Sexually Active] : not sexually active [High Risk Behavior] : no high risk behavior [Reports changes in hearing] : Reports no changes in hearing [Reports changes in vision] : Reports no changes in vision [Reports changes in dental health] : Reports no changes in dental health [Guns at Home] : no guns at home [Safety elements used in home] : no safety elements used in home [Sunscreen] : does not use sunscreen [Travel to Developing Areas] : does not  travel to developing areas [TB Exposure] : is not being exposed to tuberculosis [Caregiver Concerns] : does not have caregiver concerns [MammogramDate] : 09/21 [BoneDensityDate] : 01/19 [ColonoscopyDate] : 01/16 [FreeTextEntry2] : [facility] [de-identified] : Cataracts Removed in April left side and June her right

## 2022-06-06 NOTE — HISTORY OF PRESENT ILLNESS
[FreeTextEntry1] : Annual [de-identified] : Ms. JALEESA SILVEIRA is a 66 year old female here today for her annual\par Covid x 3 ( August 21 and Feb 22)\par Feeling well. \par

## 2022-06-06 NOTE — PHYSICAL EXAM
[Well Nourished] : well nourished [No Lymphadenopathy] : no lymphadenopathy [Thyroid Normal, No Nodules] : the thyroid was normal and there were no nodules present [Clear to Auscultation] : lungs were clear to auscultation bilaterally [Normal Rate] : normal rate  [Regular Rhythm] : with a regular rhythm [No Edema] : there was no peripheral edema [Normal] : affect was normal and insight and judgment were intact [de-identified] : cerumen impaction left EAC [de-identified] : 2/6 WILBUR heard best at aortic area

## 2022-06-07 LAB
25(OH)D3 SERPL-MCNC: 49.6 NG/ML
ALBUMIN SERPL ELPH-MCNC: 4.5 G/DL
ALP BLD-CCNC: 76 U/L
ALT SERPL-CCNC: 18 U/L
ANION GAP SERPL CALC-SCNC: 14 MMOL/L
AST SERPL-CCNC: 22 U/L
BILIRUB SERPL-MCNC: 0.2 MG/DL
BUN SERPL-MCNC: 22 MG/DL
CALCIUM SERPL-MCNC: 10.2 MG/DL
CHLORIDE SERPL-SCNC: 100 MMOL/L
CHOLEST SERPL-MCNC: 156 MG/DL
CO2 SERPL-SCNC: 26 MMOL/L
CREAT SERPL-MCNC: 0.93 MG/DL
EGFR: 67 ML/MIN/1.73M2
GLUCOSE SERPL-MCNC: 102 MG/DL
HDLC SERPL-MCNC: 47 MG/DL
LDLC SERPL CALC-MCNC: 87 MG/DL
NONHDLC SERPL-MCNC: 109 MG/DL
POTASSIUM SERPL-SCNC: 3.5 MMOL/L
PROT SERPL-MCNC: 7.2 G/DL
SODIUM SERPL-SCNC: 139 MMOL/L
TRIGL SERPL-MCNC: 110 MG/DL

## 2022-06-08 DIAGNOSIS — M85.80 OTHER SPECIFIED DISORDERS OF BONE DENSITY AND STRUCTURE, UNSPECIFIED SITE: ICD-10-CM

## 2022-06-13 ENCOUNTER — RESULT REVIEW (OUTPATIENT)
Age: 67
End: 2022-06-13

## 2022-09-23 ENCOUNTER — APPOINTMENT (OUTPATIENT)
Dept: FAMILY MEDICINE | Facility: CLINIC | Age: 67
End: 2022-09-23

## 2022-09-23 ENCOUNTER — RESULT REVIEW (OUTPATIENT)
Age: 67
End: 2022-09-23

## 2022-09-23 VITALS
BODY MASS INDEX: 33.26 KG/M2 | SYSTOLIC BLOOD PRESSURE: 144 MMHG | WEIGHT: 165 LBS | HEIGHT: 59 IN | OXYGEN SATURATION: 97 % | DIASTOLIC BLOOD PRESSURE: 80 MMHG | HEART RATE: 70 BPM | TEMPERATURE: 90.1 F

## 2022-09-23 DIAGNOSIS — M25.551 PAIN IN RIGHT HIP: ICD-10-CM

## 2022-09-23 PROCEDURE — 99213 OFFICE O/P EST LOW 20 MIN: CPT

## 2022-09-23 RX ORDER — MULTIVIT-MIN/FOLIC/VIT K/LYCOP 400-300MCG
25 MCG TABLET ORAL
Refills: 0 | Status: DISCONTINUED | COMMUNITY
End: 2022-09-23

## 2022-09-23 NOTE — HISTORY OF PRESENT ILLNESS
[FreeTextEntry8] : Ms. JALEESA SILVEIRA is a 66 year old female here today for right hip pain x 3 weeks. \par

## 2022-09-23 NOTE — HEALTH RISK ASSESSMENT
[Current] : Current [Yes] : Yes [Monthly or less (1 pt)] : Monthly or less (1 point) [1 or 2 (0 pts)] : 1 or 2 (0 points) [Never (0 pts)] : Never (0 points) [No falls in past year] : Patient reported no falls in the past year [0] : 2) Feeling down, depressed, or hopeless: Not at all (0) [PHQ-2 Negative - No further assessment needed] : PHQ-2 Negative - No further assessment needed [Audit-CScore] : 1 [de-identified] : none [de-identified] : regular [HFW5Mvryv] : 0

## 2022-09-28 ENCOUNTER — APPOINTMENT (OUTPATIENT)
Dept: FAMILY MEDICINE | Facility: CLINIC | Age: 67
End: 2022-09-28

## 2022-09-28 VITALS
WEIGHT: 165 LBS | HEIGHT: 59 IN | BODY MASS INDEX: 33.26 KG/M2 | SYSTOLIC BLOOD PRESSURE: 118 MMHG | DIASTOLIC BLOOD PRESSURE: 70 MMHG | OXYGEN SATURATION: 97 % | HEART RATE: 67 BPM | TEMPERATURE: 97.8 F

## 2022-09-28 PROCEDURE — 99213 OFFICE O/P EST LOW 20 MIN: CPT

## 2022-09-28 NOTE — HISTORY OF PRESENT ILLNESS
[FreeTextEntry8] : Pt is a 65 y/o F that presents to the clinic following for hip pain. She was seen on 9/23 and had xrays done which showed moderate arthritis. She was advised to take Aleve and ice the area. \par She continues to have moderate pain and finds it difficult to walk.

## 2022-09-28 NOTE — PHYSICAL EXAM
[Normal] : no acute distress, well nourished, well developed and well-appearing [de-identified] : b/l hip pain. tender to palpation. limited range of motion.

## 2022-10-10 ENCOUNTER — RESULT REVIEW (OUTPATIENT)
Age: 67
End: 2022-10-10

## 2023-06-19 ENCOUNTER — APPOINTMENT (OUTPATIENT)
Dept: FAMILY MEDICINE | Facility: CLINIC | Age: 68
End: 2023-06-19
Payer: COMMERCIAL

## 2023-06-19 VITALS
WEIGHT: 160 LBS | DIASTOLIC BLOOD PRESSURE: 70 MMHG | BODY MASS INDEX: 32.25 KG/M2 | HEIGHT: 59 IN | SYSTOLIC BLOOD PRESSURE: 120 MMHG

## 2023-06-19 DIAGNOSIS — M25.559 PAIN IN UNSPECIFIED HIP: ICD-10-CM

## 2023-06-19 DIAGNOSIS — R10.31 RIGHT LOWER QUADRANT PAIN: ICD-10-CM

## 2023-06-19 DIAGNOSIS — Z00.00 ENCOUNTER FOR GENERAL ADULT MEDICAL EXAMINATION W/OUT ABNORMAL FINDINGS: ICD-10-CM

## 2023-06-19 DIAGNOSIS — Z87.898 PERSONAL HISTORY OF OTHER SPECIFIED CONDITIONS: ICD-10-CM

## 2023-06-19 DIAGNOSIS — F17.210 NICOTINE DEPENDENCE, CIGARETTES, UNCOMPLICATED: ICD-10-CM

## 2023-06-19 DIAGNOSIS — Z20.822 CONTACT WITH AND (SUSPECTED) EXPOSURE TO COVID-19: ICD-10-CM

## 2023-06-19 DIAGNOSIS — H40.9 UNSPECIFIED GLAUCOMA: ICD-10-CM

## 2023-06-19 PROCEDURE — 99397 PER PM REEVAL EST PAT 65+ YR: CPT

## 2023-06-19 RX ORDER — DORZOLAMIDE HYDROCHLORIDE TIMOLOL MALEATE 20; 5 MG/ML; MG/ML
22.3-6.8 SOLUTION/ DROPS OPHTHALMIC
Qty: 10 | Refills: 0 | Status: DISCONTINUED | COMMUNITY
Start: 2021-10-28 | End: 2023-06-19

## 2023-06-19 NOTE — HISTORY OF PRESENT ILLNESS
[FreeTextEntry1] : Annual [de-identified] : Ms. JALEESA SILVEIRA is a 67 year old female here today for annual examination.\par Eating better and walking more\par Still has right hip pain. Was using meloxicam every other day. Takes with food. \par

## 2023-06-19 NOTE — REVIEW OF SYSTEMS
[Recent Change In Weight] : ~T recent weight change [Negative] : Psychiatric [Vision Problems] : no vision problems [FreeTextEntry9] : right hip pain

## 2023-06-26 DIAGNOSIS — E87.6 HYPOKALEMIA: ICD-10-CM

## 2023-06-26 DIAGNOSIS — T50.2X5A HYPOKALEMIA: ICD-10-CM

## 2023-06-26 LAB
ALBUMIN SERPL ELPH-MCNC: 4.3 G/DL
ALP BLD-CCNC: 76 U/L
ALT SERPL-CCNC: 18 U/L
ANION GAP SERPL CALC-SCNC: 12 MMOL/L
AST SERPL-CCNC: 24 U/L
BILIRUB SERPL-MCNC: 0.2 MG/DL
BUN SERPL-MCNC: 22 MG/DL
CALCIUM SERPL-MCNC: 10 MG/DL
CHLORIDE SERPL-SCNC: 105 MMOL/L
CHOLEST SERPL-MCNC: 155 MG/DL
CO2 SERPL-SCNC: 25 MMOL/L
CREAT SERPL-MCNC: 1.16 MG/DL
EGFR: 51 ML/MIN/1.73M2
GLUCOSE SERPL-MCNC: 100 MG/DL
HDLC SERPL-MCNC: 52 MG/DL
LDLC SERPL CALC-MCNC: 79 MG/DL
NONHDLC SERPL-MCNC: 104 MG/DL
POTASSIUM SERPL-SCNC: 3.4 MMOL/L
PROT SERPL-MCNC: 7.1 G/DL
SODIUM SERPL-SCNC: 143 MMOL/L
TRIGL SERPL-MCNC: 124 MG/DL

## 2023-06-27 ENCOUNTER — APPOINTMENT (OUTPATIENT)
Dept: THORACIC SURGERY | Facility: CLINIC | Age: 68
End: 2023-06-27
Payer: COMMERCIAL

## 2023-06-27 VITALS — BODY MASS INDEX: 33.26 KG/M2 | WEIGHT: 165 LBS | HEIGHT: 59 IN

## 2023-06-27 DIAGNOSIS — F17.210 NICOTINE DEPENDENCE, CIGARETTES, UNCOMPLICATED: ICD-10-CM

## 2023-06-27 PROCEDURE — G0296 VISIT TO DETERM LDCT ELIG: CPT

## 2023-06-27 RX ORDER — COLD-HOT PACK
EACH MISCELLANEOUS DAILY
Qty: 90 | Refills: 3 | Status: ACTIVE | COMMUNITY
Start: 2023-06-27 | End: 1900-01-01

## 2023-06-27 NOTE — HISTORY OF PRESENT ILLNESS
[Current] : Current [TextBox_13] : Referred by Dr. Rodriguez \par \par JALEESA SILVEIRA had telephonic visit for a review of eligibility and discussion of the Low dose CT lung cancer screening program. The following was reviewed and confirmed the patient meets screening eligibility criteria.\par -Age 67 year\par Smoking Status:\par -Current smoker\par Started smoking at 15   years old. \par -smokes up to 10 cigarettes per day for 52 years.\par -Number of pack years smokin\par \par Ms. SILVEIRA denies any signs or symptoms of lung cancer including new cough, changing cough, hemoptysis, and unintentional weight loss. \par \par Ms. SILVEIRA reports dx HTN and  high cholesterol. Covid infection 2022, mild symptoms. She denies COPD, heart disease, connective tissue disease, and any personal history of cancer. Reports no lung cancer in a 1st degree relative. Reports no lung cancer in a 2nd degree relative. Denies any  history of  occupational exposures. Had exposure to 2nd hand smoke while growing up.\par  [PacksperYear] : 26

## 2023-06-27 NOTE — ASSESSMENT
[Discussed Risks and Advised to Quit Smoking] : Discussed risks and advised to quit smoking [Discussed Cessation Medication] : cessation medication was discussed [Discussed Cessation Strategies] : cessation strategies were discussed [de-identified] : Acknowledged how  difficult it is to quit smoking. Advised that quitting smoking is the most important thing a person can do for their health. Discussed strategies to deal with cravings.  Discussed the importance of having a strategy planned in advance of quit date. Discussed use of daily nicotine patch and use of gum or lozenge prn for cravings. \par

## 2023-06-27 NOTE — PLAN
[Smoking Cessation Guidance Provided] : Smoking cessation guidance was provided to patient [[facility]] : referred to [facility] [phone] [Smoking Cessation] : smoking cessation [FreeTextEntry1] : Plan:\par \par -Low dose CT chest for lung cancer screening.    RAVIN BALL has been CC'd in order for LDCT.\par \par -Follow up with patient and her referring provider after her LDCT results have been reviewed by the multidisciplinary clinical team, if needed.\par \par -Encouraged smoking cessation.\par \par -Referred to CTC\par \par Should I screen? tool utilized. 6 Year risk of lung cancer is  2.7 %. \par \par Patient wishes to proceed with screening.\par \par Engaged in discussion regarding risks of screening during Covid-19 pandemic and precautions that are being used  to reduce exposure.\par \par Engaged in shared decision making with Ms. SILVEIRA . Answered all questions. She verbalized understanding and agreement. She knows to call back with and questions or concerns.\par

## 2023-07-03 ENCOUNTER — NON-APPOINTMENT (OUTPATIENT)
Age: 68
End: 2023-07-03

## 2023-07-03 ENCOUNTER — APPOINTMENT (OUTPATIENT)
Dept: CARDIOLOGY | Facility: CLINIC | Age: 68
End: 2023-07-03
Payer: COMMERCIAL

## 2023-07-03 VITALS
HEIGHT: 59 IN | SYSTOLIC BLOOD PRESSURE: 141 MMHG | BODY MASS INDEX: 32.66 KG/M2 | HEART RATE: 74 BPM | DIASTOLIC BLOOD PRESSURE: 71 MMHG | OXYGEN SATURATION: 97 % | WEIGHT: 162 LBS | RESPIRATION RATE: 14 BRPM

## 2023-07-03 DIAGNOSIS — E78.5 HYPERLIPIDEMIA, UNSPECIFIED: ICD-10-CM

## 2023-07-03 DIAGNOSIS — I10 ESSENTIAL (PRIMARY) HYPERTENSION: ICD-10-CM

## 2023-07-03 DIAGNOSIS — R01.1 CARDIAC MURMUR, UNSPECIFIED: ICD-10-CM

## 2023-07-03 PROCEDURE — 93000 ELECTROCARDIOGRAM COMPLETE: CPT

## 2023-07-03 PROCEDURE — 99204 OFFICE O/P NEW MOD 45 MIN: CPT | Mod: 25

## 2023-07-04 PROBLEM — E78.5 DYSLIPIDEMIA: Status: ACTIVE | Noted: 2020-09-26

## 2023-07-04 PROBLEM — R01.1 HEART MURMUR: Status: ACTIVE | Noted: 2023-06-19

## 2023-07-04 PROBLEM — I10 ESSENTIAL HYPERTENSION: Status: ACTIVE | Noted: 2018-12-27

## 2023-07-04 RX ORDER — NICOTINE POLACRILEX 2 MG/1
2 GUM, CHEWING BUCCAL
Qty: 170 | Refills: 0 | Status: ACTIVE | COMMUNITY
Start: 2023-06-27

## 2023-07-04 NOTE — ASSESSMENT
[FreeTextEntry1] : 66 yo female with hypertension, hyperlipidemia, and cardiac murmur. \par ECG today demonstrated sinus rhythm, rate 68 bpm.\par \par Given cardiac murmur and hypertension, patient is to undergo echocardiogram to assess function and structural heart disease. I spoke with Dr. Rodriguez's office who reported that they will schedule the echocardiogram. Will follow-up results when available.\par Pending review of echo results, will determine if further cardiac work-up or intervention is clinically indicated.\par \par BP is currently controlled.\par Will continue amlodipine 10 mg po daily and lisinopril-HCTZ (10/12.5 mg) po daily.\par \par LDL 79 per 6/19/23 labs\par Will continue atorvastatin 80 mg po daily for hyperlipidemia management.

## 2023-07-04 NOTE — HISTORY OF PRESENT ILLNESS
[FreeTextEntry1] : 66 yo female with hypertension and hyperlipidemia, who presents today for cardiac evaluation of cardiac murmur noted by her primary care physician. Patient denies chest pain, dyspnea, palpitations, syncope, edema, melena, hematochezia, or hematemesis. She walks 3x/week for 45 minutes and can go up several flights of stairs without complaints. \par

## 2023-07-04 NOTE — PHYSICAL EXAM
[Well Developed] : well developed [Well Nourished] : well nourished [No Acute Distress] : no acute distress [Normal Conjunctiva] : normal conjunctiva [Normal Venous Pressure] : normal venous pressure [No Carotid Bruit] : no carotid bruit [Normal Rate] : normal [Rhythm Regular] : regular [Normal S1] : normal S1 [Normal S2] : normal S2 [S3] : no S3 [S4] : no S4 [No Pitting Edema] : no pitting edema present [I] : a grade 1 [Right Carotid Bruit] : no bruit heard over the right carotid [Left Carotid Bruit] : no bruit heard over the left carotid [Clear Lung Fields] : clear lung fields [Good Air Entry] : good air entry [No Respiratory Distress] : no respiratory distress  [No Edema] : no edema [No Cyanosis] : no cyanosis [No Clubbing] : no clubbing [Moves all extremities] : moves all extremities [No Focal Deficits] : no focal deficits [Normal Speech] : normal speech [Alert and Oriented] : alert and oriented [Normal memory] : normal memory

## 2023-07-08 ENCOUNTER — RESULT REVIEW (OUTPATIENT)
Age: 68
End: 2023-07-08

## 2023-07-14 ENCOUNTER — NON-APPOINTMENT (OUTPATIENT)
Age: 68
End: 2023-07-14

## 2023-08-02 RX ORDER — LISINOPRIL AND HYDROCHLOROTHIAZIDE TABLETS 10; 12.5 MG/1; MG/1
10-12.5 TABLET ORAL
Qty: 90 | Refills: 3 | Status: ACTIVE | COMMUNITY
Start: 2018-12-27 | End: 1900-01-01

## 2023-09-25 RX ORDER — AMLODIPINE BESYLATE 10 MG/1
10 TABLET ORAL
Qty: 90 | Refills: 3 | Status: ACTIVE | COMMUNITY
Start: 2018-12-27 | End: 1900-01-01

## 2023-11-04 ENCOUNTER — RESULT REVIEW (OUTPATIENT)
Age: 68
End: 2023-11-04

## 2023-11-13 NOTE — ASSESSMENT
Sent Webex asking front office to let RN know if pt's son returns call. [Patient Optimized for Surgery] : Patient optimized for surgery [No Further Testing Recommended] : no further testing recommended [Continue medications as is] : Continue current medications [FreeTextEntry4] : Low risk procedure.

## 2023-11-28 RX ORDER — MELOXICAM 7.5 MG/1
7.5 TABLET ORAL DAILY
Qty: 30 | Refills: 0 | Status: ACTIVE | COMMUNITY
Start: 2022-09-28 | End: 1900-01-01

## 2024-06-22 RX ORDER — ATORVASTATIN CALCIUM 80 MG/1
80 TABLET, FILM COATED ORAL
Qty: 90 | Refills: 0 | Status: ACTIVE | COMMUNITY
Start: 2018-12-27 | End: 1900-01-01

## 2024-08-07 ENCOUNTER — APPOINTMENT (OUTPATIENT)
Dept: INTERNAL MEDICINE | Facility: CLINIC | Age: 69
End: 2024-08-07

## 2024-08-07 PROBLEM — R73.03 PREDIABETES: Status: ACTIVE | Noted: 2019-03-27

## 2024-08-07 PROCEDURE — 99203 OFFICE O/P NEW LOW 30 MIN: CPT

## 2024-08-07 PROCEDURE — G2211 COMPLEX E/M VISIT ADD ON: CPT | Mod: NC

## 2024-08-07 PROCEDURE — 36415 COLL VENOUS BLD VENIPUNCTURE: CPT

## 2024-08-07 NOTE — HISTORY OF PRESENT ILLNESS
[de-identified] : Patient is a 68F with HTN, HLD, hip arthritis (s/p PT), presents today in follow up for HLD, HTN  Has been without lisinopril x 1 week  MR hip with Rey Jordan - arthritis, pulled muscle. Pain is improved. Needs hip replacement but scared  Works at [employer] in [place]

## 2024-08-07 NOTE — HEALTH RISK ASSESSMENT
[Yes] : Yes [Monthly or less (1 pt)] : Monthly or less (1 point) [1 or 2 (0 pts)] : 1 or 2 (0 points) [Never (0 pts)] : Never (0 points) [No] : In the past 12 months have you used drugs other than those required for medical reasons? No [No falls in past year] : Patient reported no falls in the past year [0] : 2) Feeling down, depressed, or hopeless: Not at all (0) [PHQ-2 Negative - No further assessment needed] : PHQ-2 Negative - No further assessment needed [Current] : Current [de-identified] : social drinker [Audit-CScore] : 1 [de-identified] : none [de-identified] : well balanced  [[id number]] : 0 [20 or more] : 20 or more [de-identified] : 5 cigs a day     44 yrs

## 2024-08-11 PROBLEM — N17.9 AKI (ACUTE KIDNEY INJURY): Status: ACTIVE | Noted: 2024-08-11

## 2024-08-19 ENCOUNTER — LABORATORY RESULT (OUTPATIENT)
Age: 69
End: 2024-08-19

## 2024-09-17 ENCOUNTER — APPOINTMENT (OUTPATIENT)
Dept: GERIATRICS | Facility: CLINIC | Age: 69
End: 2024-09-17

## 2024-10-04 ENCOUNTER — APPOINTMENT (OUTPATIENT)
Dept: THORACIC SURGERY | Facility: CLINIC | Age: 69
End: 2024-10-04
Payer: COMMERCIAL

## 2024-10-04 VITALS — WEIGHT: 165 LBS | BODY MASS INDEX: 33.26 KG/M2 | HEIGHT: 59 IN

## 2024-10-04 DIAGNOSIS — F17.210 NICOTINE DEPENDENCE, CIGARETTES, UNCOMPLICATED: ICD-10-CM

## 2024-10-04 PROCEDURE — G0296 VISIT TO DETERM LDCT ELIG: CPT

## 2024-10-04 NOTE — PLAN
[Smoking Cessation Guidance Provided] : Smoking cessation guidance was provided to patient [Smoking Cessation] : smoking cessation [FreeTextEntry1] : Plan:  -Low dose CT chest for lung cancer screening.      ALFREDO BURGESS has been CC'd in order for LDCT.   -Follow up with patient and her referring provider after her LDCT results have been reviewed by the multidisciplinary clinical team, if needed.    -Encouraged smoking cessation.    -Patient declines referral to [facility] and [facility]    Should I screen? tool utilized. 6 Year risk of lung cancer is  4.3 %.    Patient wishes to proceed with screening.   Engaged in discussion regarding risks of screening during Covid-19 pandemic and precautions that are being used  to reduce exposure.   Engaged in shared decision making with Ms. SILVEIRA . Answered all questions. She verbalized understanding and agreement. She knows to call back with and questions or concerns.

## 2024-10-04 NOTE — DATA REVIEWED
[Lung Cancer Screening] : Patient underwent lung cancer screening [2] : 2 [TextBox_12] : 07/23 [TextBox_52] : 1 20

## 2024-10-04 NOTE — HISTORY OF PRESENT ILLNESS
[Current] : Current [TextBox_13] : PCP Dr. Hamlet SILVEIRA had telephonic visit for a review of eligibility and discussion of the Low dose CT lung cancer screening program. The following was reviewed and confirmed the patient meets screening eligibility criteria.  Smoking Status: -Current smoker Started smoking at 15 years old. -smokes up to 10 cigarettes per day for 52 years, then smoked between 8-10 cigarettes per day for 1 year. -Number of pack years smokin  Ms. SILVEIRA denies any signs or symptoms of lung cancer including new cough, changing cough, hemoptysis, and unintentional weight loss.  Ms. SILVEIRA reports dx HTN and high cholesterol. Had radiological evidence emphysema on LDCT from 2023. She denies connective tissue disease and any personal history of cancer. Reports no lung cancer in a 1st degree relative. Reports no lung cancer in a 2nd degree relative. Denies any history of occupational exposures. Had childhood exposure to 2nd hand.   [PacksperYear] : 27

## 2024-10-04 NOTE — ASSESSMENT
[Ready] : Patient is ready for cessation intervention [Contemplation] : Contemplation: The patient is considering quitting smoking [de-identified] : Acknowledged how  difficult it is to quit smoking. Advised that quitting smoking is the most important thing a person can do for their health. Discussed strategies to deal with cravings. Suggested keeping a log of cigarette use and the triggers in an effort to identify triggers and break routines/habits. Suggested writing down time of each cigarette and attempting to delay the next cigarette. Another idea is to not smoke the cigarettes she doesnt care for that she has identified on the log.  She does not want to use NRT

## 2024-10-25 ENCOUNTER — NON-APPOINTMENT (OUTPATIENT)
Age: 69
End: 2024-10-25

## 2024-10-30 ENCOUNTER — APPOINTMENT (OUTPATIENT)
Dept: PULMONOLOGY | Facility: CLINIC | Age: 69
End: 2024-10-30
Payer: COMMERCIAL

## 2024-10-30 VITALS
HEIGHT: 59 IN | BODY MASS INDEX: 33.06 KG/M2 | DIASTOLIC BLOOD PRESSURE: 43 MMHG | OXYGEN SATURATION: 95 % | WEIGHT: 164 LBS | SYSTOLIC BLOOD PRESSURE: 139 MMHG | HEART RATE: 63 BPM | TEMPERATURE: 95.4 F

## 2024-10-30 DIAGNOSIS — R91.8 OTHER NONSPECIFIC ABNORMAL FINDING OF LUNG FIELD: ICD-10-CM

## 2024-10-30 DIAGNOSIS — F17.210 NICOTINE DEPENDENCE, CIGARETTES, UNCOMPLICATED: ICD-10-CM

## 2024-10-30 DIAGNOSIS — J43.2 CENTRILOBULAR EMPHYSEMA: ICD-10-CM

## 2024-10-30 PROCEDURE — 99204 OFFICE O/P NEW MOD 45 MIN: CPT | Mod: 25

## 2024-10-30 PROCEDURE — 99407 BEHAV CHNG SMOKING > 10 MIN: CPT

## 2024-10-30 RX ORDER — VARENICLINE TARTRATE 1 MG/1
1 TABLET, FILM COATED ORAL TWICE DAILY
Qty: 60 | Refills: 4 | Status: ACTIVE | COMMUNITY
Start: 2024-10-30 | End: 1900-01-01

## 2024-10-30 RX ORDER — VARENICLINE TARTRATE 0.5 MG/1
0.5 TABLET, FILM COATED ORAL
Qty: 11 | Refills: 0 | Status: ACTIVE | COMMUNITY
Start: 2024-10-30 | End: 1900-01-01

## 2024-11-04 ENCOUNTER — APPOINTMENT (OUTPATIENT)
Dept: PULMONOLOGY | Facility: CLINIC | Age: 69
End: 2024-11-04
Payer: COMMERCIAL

## 2024-11-04 PROCEDURE — 94727 GAS DIL/WSHOT DETER LNG VOL: CPT

## 2024-11-04 PROCEDURE — 94060 EVALUATION OF WHEEZING: CPT

## 2024-11-04 PROCEDURE — 94729 DIFFUSING CAPACITY: CPT

## 2024-11-21 DIAGNOSIS — Z12.39 ENCOUNTER FOR OTHER SCREENING FOR MALIGNANT NEOPLASM OF BREAST: ICD-10-CM

## 2025-01-23 ENCOUNTER — RESULT REVIEW (OUTPATIENT)
Age: 70
End: 2025-01-23

## 2025-01-27 ENCOUNTER — APPOINTMENT (OUTPATIENT)
Dept: PULMONOLOGY | Facility: CLINIC | Age: 70
End: 2025-01-27
Payer: COMMERCIAL

## 2025-01-27 VITALS
DIASTOLIC BLOOD PRESSURE: 60 MMHG | OXYGEN SATURATION: 91 % | WEIGHT: 164 LBS | HEIGHT: 59 IN | BODY MASS INDEX: 33.06 KG/M2 | SYSTOLIC BLOOD PRESSURE: 144 MMHG | TEMPERATURE: 96 F | HEART RATE: 85 BPM

## 2025-01-27 DIAGNOSIS — R91.8 OTHER NONSPECIFIC ABNORMAL FINDING OF LUNG FIELD: ICD-10-CM

## 2025-01-27 DIAGNOSIS — J43.2 CENTRILOBULAR EMPHYSEMA: ICD-10-CM

## 2025-01-27 DIAGNOSIS — F17.210 NICOTINE DEPENDENCE, CIGARETTES, UNCOMPLICATED: ICD-10-CM

## 2025-01-27 PROCEDURE — 99406 BEHAV CHNG SMOKING 3-10 MIN: CPT

## 2025-01-27 PROCEDURE — 99213 OFFICE O/P EST LOW 20 MIN: CPT | Mod: 25

## 2025-02-03 ENCOUNTER — APPOINTMENT (OUTPATIENT)
Dept: INTERNAL MEDICINE | Facility: CLINIC | Age: 70
End: 2025-02-03
Payer: COMMERCIAL

## 2025-02-03 VITALS
BODY MASS INDEX: 33.06 KG/M2 | DIASTOLIC BLOOD PRESSURE: 74 MMHG | OXYGEN SATURATION: 98 % | HEART RATE: 67 BPM | HEIGHT: 59 IN | SYSTOLIC BLOOD PRESSURE: 142 MMHG | WEIGHT: 164 LBS

## 2025-02-03 DIAGNOSIS — E78.5 HYPERLIPIDEMIA, UNSPECIFIED: ICD-10-CM

## 2025-02-03 DIAGNOSIS — Z00.00 ENCOUNTER FOR GENERAL ADULT MEDICAL EXAMINATION W/OUT ABNORMAL FINDINGS: ICD-10-CM

## 2025-02-03 DIAGNOSIS — Z12.11 ENCOUNTER FOR SCREENING FOR MALIGNANT NEOPLASM OF COLON: ICD-10-CM

## 2025-02-03 DIAGNOSIS — R73.03 PREDIABETES.: ICD-10-CM

## 2025-02-03 DIAGNOSIS — N17.9 ACUTE KIDNEY FAILURE, UNSPECIFIED: ICD-10-CM

## 2025-02-03 DIAGNOSIS — E55.9 VITAMIN D DEFICIENCY, UNSPECIFIED: ICD-10-CM

## 2025-02-03 PROCEDURE — 99397 PER PM REEVAL EST PAT 65+ YR: CPT

## 2025-02-03 PROCEDURE — 36415 COLL VENOUS BLD VENIPUNCTURE: CPT

## 2025-02-05 ENCOUNTER — APPOINTMENT (OUTPATIENT)
Dept: THORACIC SURGERY | Facility: CLINIC | Age: 70
End: 2025-02-05

## 2025-02-05 LAB
25(OH)D3 SERPL-MCNC: 29.8 NG/ML
ALBUMIN SERPL ELPH-MCNC: 4.5 G/DL
ALP BLD-CCNC: 69 U/L
ALT SERPL-CCNC: 19 U/L
ANION GAP SERPL CALC-SCNC: 11 MMOL/L
AST SERPL-CCNC: 23 U/L
BASOPHILS # BLD AUTO: 0.03 K/UL
BASOPHILS NFR BLD AUTO: 0.4 %
BILIRUB SERPL-MCNC: 0.5 MG/DL
BUN SERPL-MCNC: 21 MG/DL
CALCIUM SERPL-MCNC: 10.3 MG/DL
CHLORIDE SERPL-SCNC: 101 MMOL/L
CHOLEST SERPL-MCNC: 167 MG/DL
CO2 SERPL-SCNC: 27 MMOL/L
CREAT SERPL-MCNC: 0.85 MG/DL
CYSTATIN C SERPL-MCNC: 0.89 MG/L
EGFR: 74 ML/MIN/1.73M2
EOSINOPHIL # BLD AUTO: 0.11 K/UL
EOSINOPHIL NFR BLD AUTO: 1.3 %
ESTIMATED AVERAGE GLUCOSE: 120 MG/DL
GFR/BSA.PRED SERPLBLD CYS-BASED-ARV: 81 ML/MIN/1.73M2
GLUCOSE SERPL-MCNC: 95 MG/DL
HBA1C MFR BLD HPLC: 5.8 %
HCT VFR BLD CALC: 44 %
HDLC SERPL-MCNC: 61 MG/DL
HGB BLD-MCNC: 14.2 G/DL
IMM GRANULOCYTES NFR BLD AUTO: 0.4 %
LDLC SERPL CALC-MCNC: 93 MG/DL
LYMPHOCYTES # BLD AUTO: 2.97 K/UL
LYMPHOCYTES NFR BLD AUTO: 36.2 %
MAN DIFF?: NORMAL
MCHC RBC-ENTMCNC: 31.3 PG
MCHC RBC-ENTMCNC: 32.3 G/DL
MCV RBC AUTO: 96.9 FL
MONOCYTES # BLD AUTO: 0.43 K/UL
MONOCYTES NFR BLD AUTO: 5.2 %
NEUTROPHILS # BLD AUTO: 4.64 K/UL
NEUTROPHILS NFR BLD AUTO: 56.5 %
NONHDLC SERPL-MCNC: 106 MG/DL
PLATELET # BLD AUTO: 224 K/UL
POTASSIUM SERPL-SCNC: 4 MMOL/L
PROT SERPL-MCNC: 7.3 G/DL
RBC # BLD: 4.54 M/UL
RBC # FLD: 15.6 %
SODIUM SERPL-SCNC: 139 MMOL/L
TRIGL SERPL-MCNC: 68 MG/DL
WBC # FLD AUTO: 8.21 K/UL

## 2025-02-15 ENCOUNTER — RESULT REVIEW (OUTPATIENT)
Age: 70
End: 2025-02-15

## 2025-02-21 ENCOUNTER — NON-APPOINTMENT (OUTPATIENT)
Age: 70
End: 2025-02-21

## 2025-02-28 ENCOUNTER — NON-APPOINTMENT (OUTPATIENT)
Age: 70
End: 2025-02-28

## 2025-04-04 ENCOUNTER — APPOINTMENT (OUTPATIENT)
Dept: ORTHOPEDIC SURGERY | Facility: CLINIC | Age: 70
End: 2025-04-04

## 2025-04-04 VITALS
HEIGHT: 59 IN | BODY MASS INDEX: 33.67 KG/M2 | SYSTOLIC BLOOD PRESSURE: 122 MMHG | DIASTOLIC BLOOD PRESSURE: 77 MMHG | OXYGEN SATURATION: 95 % | TEMPERATURE: 97.5 F | HEART RATE: 77 BPM | RESPIRATION RATE: 17 BRPM | WEIGHT: 167 LBS

## 2025-04-04 DIAGNOSIS — M16.11 UNILATERAL PRIMARY OSTEOARTHRITIS, RIGHT HIP: ICD-10-CM

## 2025-04-04 PROCEDURE — 73502 X-RAY EXAM HIP UNI 2-3 VIEWS: CPT

## 2025-04-04 PROCEDURE — 99204 OFFICE O/P NEW MOD 45 MIN: CPT | Mod: 25

## 2025-06-10 ENCOUNTER — APPOINTMENT (OUTPATIENT)
Dept: PULMONOLOGY | Facility: CLINIC | Age: 70
End: 2025-06-10
Payer: COMMERCIAL

## 2025-06-10 VITALS
HEART RATE: 64 BPM | HEIGHT: 59 IN | OXYGEN SATURATION: 97 % | DIASTOLIC BLOOD PRESSURE: 50 MMHG | WEIGHT: 176 LBS | SYSTOLIC BLOOD PRESSURE: 140 MMHG | BODY MASS INDEX: 35.48 KG/M2

## 2025-06-10 PROBLEM — Z01.811 ENCOUNTER FOR PREOPERATIVE PULMONARY EXAMINATION: Status: ACTIVE | Noted: 2025-06-10

## 2025-06-10 PROCEDURE — 99214 OFFICE O/P EST MOD 30 MIN: CPT

## 2025-06-24 ENCOUNTER — RESULT REVIEW (OUTPATIENT)
Age: 70
End: 2025-06-24

## 2025-07-01 ENCOUNTER — RESULT REVIEW (OUTPATIENT)
Age: 70
End: 2025-07-01

## 2025-07-01 ENCOUNTER — TRANSCRIPTION ENCOUNTER (OUTPATIENT)
Age: 70
End: 2025-07-01

## 2025-07-01 ENCOUNTER — APPOINTMENT (OUTPATIENT)
Dept: ORTHOPEDIC SURGERY | Facility: HOSPITAL | Age: 70
End: 2025-07-01
Payer: COMMERCIAL

## 2025-07-01 PROCEDURE — 27130 TOTAL HIP ARTHROPLASTY: CPT | Mod: RT

## 2025-07-08 ENCOUNTER — TRANSCRIPTION ENCOUNTER (OUTPATIENT)
Age: 70
End: 2025-07-08

## 2025-07-09 ENCOUNTER — NON-APPOINTMENT (OUTPATIENT)
Age: 70
End: 2025-07-09

## 2025-07-30 ENCOUNTER — APPOINTMENT (OUTPATIENT)
Dept: ORTHOPEDIC SURGERY | Facility: CLINIC | Age: 70
End: 2025-07-30
Payer: COMMERCIAL

## 2025-07-30 VITALS
BODY MASS INDEX: 35.48 KG/M2 | OXYGEN SATURATION: 94 % | HEIGHT: 59 IN | RESPIRATION RATE: 16 BRPM | SYSTOLIC BLOOD PRESSURE: 104 MMHG | HEART RATE: 75 BPM | DIASTOLIC BLOOD PRESSURE: 68 MMHG | WEIGHT: 176 LBS

## 2025-07-30 DIAGNOSIS — Z96.641 PRESENCE OF RIGHT ARTIFICIAL HIP JOINT: ICD-10-CM

## 2025-07-30 PROCEDURE — 99024 POSTOP FOLLOW-UP VISIT: CPT

## 2025-07-30 PROCEDURE — 72170 X-RAY EXAM OF PELVIS: CPT

## 2025-08-04 ENCOUNTER — APPOINTMENT (OUTPATIENT)
Dept: INTERNAL MEDICINE | Facility: CLINIC | Age: 70
End: 2025-08-04
Payer: COMMERCIAL

## 2025-08-04 VITALS
SYSTOLIC BLOOD PRESSURE: 122 MMHG | HEART RATE: 72 BPM | HEIGHT: 59 IN | TEMPERATURE: 98.1 F | RESPIRATION RATE: 16 BRPM | OXYGEN SATURATION: 95 % | DIASTOLIC BLOOD PRESSURE: 62 MMHG

## 2025-08-04 DIAGNOSIS — M16.11 UNILATERAL PRIMARY OSTEOARTHRITIS, RIGHT HIP: ICD-10-CM

## 2025-08-04 DIAGNOSIS — R73.03 PREDIABETES.: ICD-10-CM

## 2025-08-04 DIAGNOSIS — E78.5 HYPERLIPIDEMIA, UNSPECIFIED: ICD-10-CM

## 2025-08-04 DIAGNOSIS — I10 ESSENTIAL (PRIMARY) HYPERTENSION: ICD-10-CM

## 2025-08-04 PROCEDURE — G2211 COMPLEX E/M VISIT ADD ON: CPT | Mod: NC

## 2025-08-04 PROCEDURE — 99214 OFFICE O/P EST MOD 30 MIN: CPT

## 2025-08-27 ENCOUNTER — NON-APPOINTMENT (OUTPATIENT)
Age: 70
End: 2025-08-27